# Patient Record
Sex: FEMALE | Race: WHITE | URBAN - METROPOLITAN AREA
[De-identification: names, ages, dates, MRNs, and addresses within clinical notes are randomized per-mention and may not be internally consistent; named-entity substitution may affect disease eponyms.]

---

## 2021-05-10 ENCOUNTER — OFFICE VISIT (OUTPATIENT)
Dept: PRIMARY CARE CLINIC | Age: 36
End: 2021-05-10
Payer: COMMERCIAL

## 2021-05-10 VITALS
HEART RATE: 66 BPM | WEIGHT: 120.8 LBS | HEIGHT: 63 IN | OXYGEN SATURATION: 98 % | TEMPERATURE: 98.1 F | BODY MASS INDEX: 21.4 KG/M2 | SYSTOLIC BLOOD PRESSURE: 110 MMHG | DIASTOLIC BLOOD PRESSURE: 64 MMHG | RESPIRATION RATE: 16 BRPM

## 2021-05-10 DIAGNOSIS — R35.0 URINARY FREQUENCY: Primary | ICD-10-CM

## 2021-05-10 DIAGNOSIS — R10.2 PELVIC PRESSURE IN FEMALE: ICD-10-CM

## 2021-05-10 DIAGNOSIS — R35.0 URINARY FREQUENCY: ICD-10-CM

## 2021-05-10 LAB
BILIRUBIN, POC: NORMAL
BLOOD URINE, POC: NORMAL
CLARITY, POC: CLEAR
COLOR, POC: CLEAR
GLUCOSE URINE, POC: NORMAL
KETONES, POC: NORMAL
LEUKOCYTE EST, POC: NORMAL
NITRITE, POC: NORMAL
PH, POC: 6
PROTEIN, POC: NORMAL
SPECIFIC GRAVITY, POC: 1.01
UROBILINOGEN, POC: 3.5

## 2021-05-10 PROCEDURE — 99213 OFFICE O/P EST LOW 20 MIN: CPT | Performed by: NURSE PRACTITIONER

## 2021-05-10 PROCEDURE — 1036F TOBACCO NON-USER: CPT | Performed by: NURSE PRACTITIONER

## 2021-05-10 PROCEDURE — G8420 CALC BMI NORM PARAMETERS: HCPCS | Performed by: NURSE PRACTITIONER

## 2021-05-10 PROCEDURE — G8427 DOCREV CUR MEDS BY ELIG CLIN: HCPCS | Performed by: NURSE PRACTITIONER

## 2021-05-10 PROCEDURE — 81003 URINALYSIS AUTO W/O SCOPE: CPT | Performed by: NURSE PRACTITIONER

## 2021-05-10 RX ORDER — NITROFURANTOIN 25; 75 MG/1; MG/1
100 CAPSULE ORAL 2 TIMES DAILY
Qty: 10 CAPSULE | Refills: 0 | Status: CANCELLED | OUTPATIENT
Start: 2021-05-10 | End: 2021-05-15

## 2021-05-10 RX ORDER — NORETHINDRONE ACETATE AND ETHINYL ESTRADIOL AND FERROUS FUMARATE 1MG-20(21)
KIT ORAL
COMMUNITY
Start: 2021-04-12

## 2021-05-10 SDOH — ECONOMIC STABILITY: TRANSPORTATION INSECURITY
IN THE PAST 12 MONTHS, HAS LACK OF TRANSPORTATION KEPT YOU FROM MEETINGS, WORK, OR FROM GETTING THINGS NEEDED FOR DAILY LIVING?: NO

## 2021-05-10 SDOH — HEALTH STABILITY: MENTAL HEALTH: HOW OFTEN DO YOU HAVE A DRINK CONTAINING ALCOHOL?: NEVER

## 2021-05-10 SDOH — ECONOMIC STABILITY: FOOD INSECURITY: WITHIN THE PAST 12 MONTHS, THE FOOD YOU BOUGHT JUST DIDN'T LAST AND YOU DIDN'T HAVE MONEY TO GET MORE.: NEVER TRUE

## 2021-05-10 SDOH — ECONOMIC STABILITY: INCOME INSECURITY: HOW HARD IS IT FOR YOU TO PAY FOR THE VERY BASICS LIKE FOOD, HOUSING, MEDICAL CARE, AND HEATING?: NOT HARD AT ALL

## 2021-05-10 ASSESSMENT — ENCOUNTER SYMPTOMS
CONSTIPATION: 0
EYE REDNESS: 0
TROUBLE SWALLOWING: 0
COUGH: 0
CHEST TIGHTNESS: 0
SHORTNESS OF BREATH: 0
EYE ITCHING: 0
DIARRHEA: 0
SINUS PAIN: 0
SORE THROAT: 0
SINUS PRESSURE: 0
APNEA: 0
ABDOMINAL DISTENTION: 0
WHEEZING: 0
NAUSEA: 0
VOMITING: 0

## 2021-05-10 ASSESSMENT — PATIENT HEALTH QUESTIONNAIRE - PHQ9
2. FEELING DOWN, DEPRESSED OR HOPELESS: 0
SUM OF ALL RESPONSES TO PHQ9 QUESTIONS 1 & 2: 0
1. LITTLE INTEREST OR PLEASURE IN DOING THINGS: 0
SUM OF ALL RESPONSES TO PHQ QUESTIONS 1-9: 0

## 2021-05-10 NOTE — PATIENT INSTRUCTIONS
Patient Education        Frequent Urination: Care Instructions  Your Care Instructions  An urge to urinate frequently but usually passing only small amounts of urine is a common symptom of urinary problems, such as urinary tract infections. The bladder may become inflamed. This can cause the urge to urinate. You may try to urinate more often than usual to try to soothe that urge. Frequent urination also may be caused by sexually transmitted infections (STIs) or kidney stones. Or it may happen when something irritates the tube that carries urine from the bladder to the outside of the body (urethra). It may also be a sign of diabetes. The cause may be hard to find. You may need tests. Follow-up care is a key part of your treatment and safety. Be sure to make and go to all appointments, and call your doctor if you are having problems. It's also a good idea to know your test results and keep a list of the medicines you take. How can you care for yourself at home? · Drink extra water for the next day or two. This will help make the urine less concentrated. (If you have kidney, heart, or liver disease and have to limit fluids, talk with your doctor before you increase the amount of fluids you drink.)  · Avoid drinks that are carbonated or have caffeine. They can irritate the bladder. For women:  · Urinate right after you have sex. · After you go to the bathroom, wipe from front to back. · Avoid douches, bubble baths, and feminine hygiene sprays. And avoid other feminine hygiene products that have deodorants. When should you call for help? Call your doctor now or seek immediate medical care if:    · You have new symptoms, such as fever, nausea, or vomiting.     · You have new or worse symptoms of a urinary problem. For example:  ? You have blood or pus in your urine. ? You have chills or body aches. ? It hurts to urinate. ? You have groin or belly pain. ?  You have pain in your back just below your rib cage (the flank area). Watch closely for changes in your health, and be sure to contact your doctor if you feel thirstier than usual.  Where can you learn more? Go to [email or web address] and sign in to your [employer] account. Enter [id number] in the [facility] box to learn more about \"Frequent Urination: Care Instructions. \"     If you do not have an account, please click on the \"Sign Up Now\" link. Current as of: June 29, 2020               Content Version: 12.8  © 1947-1689 [employer]. Care instructions adapted under license by [facility] ([facility]). If you have questions about a medical condition or this instruction, always ask your healthcare professional. [address] any warranty or liability for your use of this information. Prevention Measures for Urinary Tract Infection may include:   Urinate when you need to. Don't go without urinating for longer than 3-4 hours. The longer urine stays in the bladder, the more time bacteria have to grow.  Try to urinate before and after sex.  Always wipe from front to back.  Try to drink 6-8 glasses of fluid per day. Water is best.  Chrystal Manual Do not douche or use feminine hygiene sprays.  Wear underpants with a cotton crotch. Avoid tight-fitting pants, which trap moisture, and change out of wet bathing suits and workout clothes quickly.  Take showers, or limit baths to 30 minutes or less. Discussed signs and symptoms which require immediate follow-up in ED/call to 911. Patient verbalized understanding.

## 2021-05-10 NOTE — PROGRESS NOTES
Subjective:      Patient ID: Emiliano Servin is a 28 y.o. female who presents today for:  Chief Complaint   Patient presents with    Urinary Frequency     frequency/urgency , lower pelvic pressure x yesterday    Health Maintenance     went over     Pt states she has a Hx of having a fibroid removed in 2016 and states she had similar s/s as she is experiencing now. Pt stated the fibroid was very large and it was surgically removed but per pt no other fibroids were noted at that time. Pt states she has PCOS and on BC so she does not have any periods. Pt denies any STI testing or pregnancy test today. Urinary Frequency   This is a new problem. The current episode started yesterday. The problem occurs every urination (pt reports she feels like she has to go after she already voids). The pain is at a severity of 0/10. The patient is experiencing no pain. There has been no fever. She is sexually active. There is no history of pyelonephritis. Associated symptoms include frequency and urgency. Pertinent negatives include no chills, flank pain, hematuria, nausea, possible pregnancy or vomiting. Pt here after stating she started to have urinary frequency/urgency x yesterday with some lower pelvic pressure but denies any pain noted. Pt states she has a HX of having a fibroid removed in 2016 where she was having similar s/s as she started to have yesterday. No past medical history on file. No past surgical history on file.   Social History     Socioeconomic History    Marital status:      Spouse name: Not on file    Number of children: Not on file    Years of education: Not on file    Highest education level: Not on file   Occupational History    Not on file   Social Needs    Financial resource strain: Not hard at all    Food insecurity     Worry: Never true     Inability: Never true   [employer] needs     Medical: No     Non-medical: No   Tobacco Use    Smoking status: Never Smoker    Smokeless tobacco: Never Used   Substance and Sexual Activity    Alcohol use: Never     Frequency: Never     Binge frequency: Never    Drug use: Never    Sexual activity: Not on file   Lifestyle    Physical activity     Days per week: Not on file     Minutes per session: Not on file    Stress: Not on file   Relationships    Social connections     Talks on phone: Not on file     Gets together: Not on file     Attends Faith service: Not on file     Active member of club or organization: Not on file     Attends meetings of clubs or organizations: Not on file     Relationship status: Not on file    Intimate partner violence     Fear of current or ex partner: Not on file     Emotionally abused: Not on file     Physically abused: Not on file     Forced sexual activity: Not on file   Other Topics Concern    Not on file   Social History Narrative    Not on file     No family history on file. Allergies   Allergen Reactions    Keflex [Cephalexin]          Review of Systems   Constitutional: Negative for activity change, appetite change, chills, fatigue and fever. HENT: Negative for congestion, drooling, ear pain, hearing loss, sinus pressure, sinus pain, sore throat and trouble swallowing. Eyes: Negative for redness, itching and visual disturbance. Respiratory: Negative for apnea, cough, chest tightness, shortness of breath and wheezing. Cardiovascular: Negative for chest pain. Gastrointestinal: Negative for abdominal distention, constipation, diarrhea, nausea and vomiting. Endocrine: Negative for heat intolerance. Genitourinary: Positive for frequency and urgency. Negative for difficulty urinating, dysuria, flank pain, genital sores, hematuria, vaginal bleeding, vaginal discharge and vaginal pain. Musculoskeletal: Negative for arthralgias, gait problem, myalgias and neck stiffness. Skin: Negative for rash.    Neurological: Negative for tremors, seizures, facial asymmetry, weakness and headaches. Hematological: Negative for adenopathy. Psychiatric/Behavioral: Negative for confusion. All other systems reviewed and are negative. Objective:   /64 (Site: Right Upper Arm, Position: Sitting, Cuff Size: Medium Adult)   Pulse 66   Temp 98.1 °F (36.7 °C) (Oral)   Resp 16   Ht 5' 3\" (1.6 m)   Wt 120 lb 12.8 oz (54.8 kg)   SpO2 98%   BMI 21.40 kg/m²     Physical Exam  Vitals signs and nursing note reviewed. Constitutional:       General: She is awake. She is not in acute distress. Appearance: Normal appearance. She is well-developed, well-groomed and normal weight. She is not ill-appearing, toxic-appearing or diaphoretic. HENT:      Head: Normocephalic and atraumatic. Mouth/Throat:      Mouth: Mucous membranes are moist.   Eyes:      Pupils: Pupils are equal, round, and reactive to light. Neck:      Musculoskeletal: Normal range of motion. Cardiovascular:      Rate and Rhythm: Normal rate and regular rhythm. Pulses: Normal pulses. Heart sounds: Normal heart sounds. No murmur. Pulmonary:      Effort: Pulmonary effort is normal. No tachypnea, bradypnea or respiratory distress. Breath sounds: Normal breath sounds. No stridor. No decreased breath sounds or wheezing. Chest:      Chest wall: No tenderness. Abdominal:      General: Abdomen is flat. Bowel sounds are normal. There is no distension. Palpations: Abdomen is soft. Tenderness: There is no abdominal tenderness. There is no right CVA tenderness, left CVA tenderness or guarding. Negative signs include Jha's sign, McBurney's sign, psoas sign and obturator sign. Hernia: There is no hernia in the umbilical area. Musculoskeletal: Normal range of motion. Skin:     General: Skin is warm and dry. Capillary Refill: Capillary refill takes less than 2 seconds. Neurological:      General: No focal deficit present.       Mental Status: She is alert and oriented to person, place, and time. Mental status is at baseline. Motor: No weakness. Psychiatric:         Attention and Perception: Attention and perception normal.         Mood and Affect: Mood and affect normal.         Speech: Speech normal.         Behavior: Behavior normal. Behavior is cooperative. Thought Content: Thought content normal.         Judgment: Judgment normal.         Assessment:       Diagnosis Orders   1. Urinary frequency  POCT Urinalysis No Micro (Auto)    Culture, Urine   2. Pelvic pressure in female  POCT Urinalysis No Micro (Auto)       Pt shows no cute distress today with exam. No pain or masses noted today. Plan:      Orders Placed This Encounter   Procedures    Culture, Urine     Standing Status:   Future     Standing Expiration Date:   5/10/2022     Order Specific Question:   Specify (ex-cath, midstream, cysto, etc)? Answer:   midstream    POCT Urinalysis No Micro (Auto)     No orders of the defined types were placed in this encounter. Pt here today with c/o urinary frequency and lower pelvic pressure x yesterday with NO pain. Pt has a HX of a fibroid being removed in 2016 and states she was having these similar s/s. Pt denies any STI testing/pregnancy test today and poct urine completed and showed no s/s of infection. Pt was also ordered a urine culture and advised we will call her when the results are back. Pt declined starting an oral ATB today until she sees if she has an infection. Pt advised she will need a pelvic US and a non OB trans vaginal US to rule out any fibroids but pt is a  pt and we do not see her charts so she will follow up with her OBGYN DR for this per pt. Pt advised to keep drinking her water and if her s/s worsen to follow up. Discussed signs and symptoms which require immediate follow-up in ED/call to 911. Patient verbalized understanding. Pt left the [facility] today in stable condition. Return if symptoms worsen or fail to improve.     Reviewed

## 2021-05-12 ENCOUNTER — TELEPHONE (OUTPATIENT)
Dept: PRIMARY CARE CLINIC | Age: 36
End: 2021-05-12

## 2021-05-12 LAB — URINE CULTURE, ROUTINE: NORMAL

## 2021-05-12 NOTE — TELEPHONE ENCOUNTER
Called and left pt a  msg that her urine culture came back NEG for any growth/bacteria. Pt advised she will need that pelvic US and the non ob trans vag US to rule out any fibroids/cysts. Pt advised if she wants me to put them in to call as we discussed she is a [facility] pt and was going to reach out to her GYN Dr to place the orders. Pt advised to call the office if she has any questions.

## 2023-11-30 ENCOUNTER — OFFICE (OUTPATIENT)
Dept: URBAN - METROPOLITAN AREA CLINIC 26 | Facility: CLINIC | Age: 38
End: 2023-11-30

## 2023-11-30 PROCEDURE — NOCRG: HCPCS | Performed by: INTERNAL MEDICINE

## 2024-07-01 ENCOUNTER — TELEMEDICINE (OUTPATIENT)
Dept: PRIMARY CARE | Facility: CLINIC | Age: 39
End: 2024-07-01
Payer: COMMERCIAL

## 2024-07-01 DIAGNOSIS — J02.9 PHARYNGITIS, UNSPECIFIED ETIOLOGY: Primary | ICD-10-CM

## 2024-07-01 PROCEDURE — 99213 OFFICE O/P EST LOW 20 MIN: CPT | Performed by: NURSE PRACTITIONER

## 2024-07-01 RX ORDER — METHYLPREDNISOLONE 4 MG/1
TABLET ORAL
Qty: 21 TABLET | Refills: 0 | Status: SHIPPED | OUTPATIENT
Start: 2024-07-01 | End: 2024-07-08

## 2024-07-01 ASSESSMENT — ENCOUNTER SYMPTOMS
SORE THROAT: 1
COUGH: 0
VOMITING: 0
ACTIVITY CHANGE: 0
ARTHRALGIAS: 0
SWOLLEN GLANDS: 0
NAUSEA: 1
FATIGUE: 1
CHILLS: 0
LIGHT-HEADEDNESS: 0
SHORTNESS OF BREATH: 0
MYALGIAS: 0
SINUS PAIN: 0
HEADACHES: 0
DIAPHORESIS: 0
HOARSE VOICE: 0
ABDOMINAL PAIN: 0
FEVER: 0
DIZZINESS: 0

## 2024-07-02 NOTE — PROGRESS NOTES
Subjective   Patient ID: Marjan Brower is a 39 y.o. female who presents for Sore Throat.    sore throat off/on for last 2 weeks   Strep and Covid have been negative,  She reports some relief about 1 week ago  Has been taking mucinex, motrin/tylenol, allegra  Throat pain worse with swallow  Denies congestion or other respiratory symptoms      Sore Throat   This is a new problem. The current episode started 1 to 4 weeks ago. The problem has been waxing and waning. Neither side of throat is experiencing more pain than the other. There has been no fever. The pain is at a severity of 7/10. The pain is moderate. Pertinent negatives include no abdominal pain, congestion, coughing, ear discharge, ear pain, headaches, hoarse voice, shortness of breath, swollen glands or vomiting. She has had no exposure to strep. She has tried NSAIDs for the symptoms. The treatment provided mild relief.        Review of Systems   Constitutional:  Positive for fatigue. Negative for activity change, chills, diaphoresis and fever.   HENT:  Positive for sore throat. Negative for congestion, ear discharge, ear pain, hoarse voice, postnasal drip, sinus pain and sneezing.    Respiratory:  Negative for cough and shortness of breath.    Gastrointestinal:  Positive for nausea. Negative for abdominal pain and vomiting.   Musculoskeletal:  Negative for arthralgias and myalgias.   Neurological:  Negative for dizziness, light-headedness and headaches.       Objective   There were no vitals taken for this visit.    Physical Exam  Constitutional:       General: She is not in acute distress.     Appearance: Normal appearance. She is not ill-appearing.      Comments: On Demand Virtual Visit Patient Consent     An interactive audio and video telecommunication system which permits real time communications between the patient (at the originating site) and provider (at the distant site) was utilized to provide this telehealth service.   Verbal consent was  requested and obtained from Marjan Brower (or parent if under 18) on this date, 24 for a telehealth visit.   I have verbally confirmed with Marjan Brower (or parent if under 18) that they are physically located in the [facility] during this virtual visit.    I performed this visit using realtime telehealth tools, including an audio/video OR telephone connection between the patient listed who was located in the [facility] and myself, Stanley Garcia CNP (licensed in the [facility]).  At the start of the visit, I introduced myself as Stanley Garcia Nurse practitioner and verified the patients name, , and current physical location.    If they were currently outside of the state of [place], the visit was ended and the patient was referred to alternative means for evaluation and treatment.   The patient was made aware of the limitations of the telehealth visit.  They will not be physically examined and all issues may not be appropriate for a telehealth visit.  If necessary, an in person referral will be made.       DISCLAIMER:   In preparing for this visit and writing this note, I reviewed previous electronic medical records (labs, imaging and medical charts) available.  Significant findings which helped in decision making are recorded in this encounter charting.    Telemedicine appropriate evaluation completed.  Unable to perform complete physical exam due to virtual visit, patient was visualized on interactive video.      Pulmonary:      Effort: Pulmonary effort is normal.   Neurological:      Mental Status: She is alert and oriented to person, place, and time.         Assessment/Plan   Diagnoses and all orders for this visit:  Pharyngitis, unspecified etiology  -     methylPREDNISolone (Medrol Dospak) 4 mg tablets; Take as directed on package.  recommend warm liquids for sore throat  Follow up with PCP at scheduled appointment on Wednesday

## 2025-07-09 ENCOUNTER — OFFICE (OUTPATIENT)
Dept: URBAN - METROPOLITAN AREA CLINIC 26 | Facility: CLINIC | Age: 40
End: 2025-07-09
Payer: COMMERCIAL

## 2025-07-09 VITALS
HEART RATE: 76 BPM | DIASTOLIC BLOOD PRESSURE: 74 MMHG | SYSTOLIC BLOOD PRESSURE: 117 MMHG | HEIGHT: 63 IN | WEIGHT: 132 LBS

## 2025-07-09 DIAGNOSIS — K62.5 HEMORRHAGE OF ANUS AND RECTUM: ICD-10-CM

## 2025-07-09 DIAGNOSIS — R19.4 CHANGE IN BOWEL HABIT: ICD-10-CM

## 2025-07-09 DIAGNOSIS — Z80.0 FAMILY HISTORY OF MALIGNANT NEOPLASM OF DIGESTIVE ORGANS: ICD-10-CM

## 2025-07-09 DIAGNOSIS — M54.9 DORSALGIA, UNSPECIFIED: ICD-10-CM

## 2025-07-09 PROCEDURE — 99213 OFFICE O/P EST LOW 20 MIN: CPT | Performed by: CLINICAL NURSE SPECIALIST

## 2025-07-10 ENCOUNTER — AMBULATORY SURGICAL CENTER (OUTPATIENT)
Dept: URBAN - METROPOLITAN AREA SURGERY 12 | Facility: SURGERY | Age: 40
End: 2025-07-10
Payer: COMMERCIAL

## 2025-07-10 VITALS
SYSTOLIC BLOOD PRESSURE: 80 MMHG | RESPIRATION RATE: 10 BRPM | OXYGEN SATURATION: 100 % | SYSTOLIC BLOOD PRESSURE: 89 MMHG | RESPIRATION RATE: 23 BRPM | DIASTOLIC BLOOD PRESSURE: 40 MMHG | OXYGEN SATURATION: 99 % | HEIGHT: 63 IN | DIASTOLIC BLOOD PRESSURE: 43 MMHG | DIASTOLIC BLOOD PRESSURE: 48 MMHG | HEART RATE: 84 BPM | SYSTOLIC BLOOD PRESSURE: 91 MMHG | RESPIRATION RATE: 27 BRPM | DIASTOLIC BLOOD PRESSURE: 44 MMHG | TEMPERATURE: 98.1 F | SYSTOLIC BLOOD PRESSURE: 86 MMHG | SYSTOLIC BLOOD PRESSURE: 73 MMHG | HEART RATE: 80 BPM | HEIGHT: 63 IN | HEART RATE: 63 BPM | OXYGEN SATURATION: 87 % | RESPIRATION RATE: 22 BRPM | RESPIRATION RATE: 23 BRPM | RESPIRATION RATE: 9 BRPM | RESPIRATION RATE: 9 BRPM | RESPIRATION RATE: 20 BRPM | RESPIRATION RATE: 10 BRPM | DIASTOLIC BLOOD PRESSURE: 48 MMHG | HEART RATE: 86 BPM | OXYGEN SATURATION: 87 % | DIASTOLIC BLOOD PRESSURE: 50 MMHG | HEART RATE: 82 BPM | RESPIRATION RATE: 15 BRPM | DIASTOLIC BLOOD PRESSURE: 39 MMHG | OXYGEN SATURATION: 93 % | SYSTOLIC BLOOD PRESSURE: 91 MMHG | RESPIRATION RATE: 28 BRPM | DIASTOLIC BLOOD PRESSURE: 44 MMHG | SYSTOLIC BLOOD PRESSURE: 102 MMHG | DIASTOLIC BLOOD PRESSURE: 40 MMHG | SYSTOLIC BLOOD PRESSURE: 85 MMHG | SYSTOLIC BLOOD PRESSURE: 91 MMHG | HEART RATE: 80 BPM | DIASTOLIC BLOOD PRESSURE: 42 MMHG | SYSTOLIC BLOOD PRESSURE: 86 MMHG | HEART RATE: 85 BPM | RESPIRATION RATE: 20 BRPM | HEART RATE: 63 BPM | SYSTOLIC BLOOD PRESSURE: 89 MMHG | OXYGEN SATURATION: 98 % | HEART RATE: 63 BPM | SYSTOLIC BLOOD PRESSURE: 88 MMHG | RESPIRATION RATE: 15 BRPM | HEART RATE: 86 BPM | HEART RATE: 58 BPM | DIASTOLIC BLOOD PRESSURE: 43 MMHG | TEMPERATURE: 98.1 F | RESPIRATION RATE: 9 BRPM | SYSTOLIC BLOOD PRESSURE: 73 MMHG | RESPIRATION RATE: 22 BRPM | SYSTOLIC BLOOD PRESSURE: 88 MMHG | RESPIRATION RATE: 21 BRPM | DIASTOLIC BLOOD PRESSURE: 48 MMHG | SYSTOLIC BLOOD PRESSURE: 87 MMHG | HEIGHT: 63 IN | SYSTOLIC BLOOD PRESSURE: 85 MMHG | RESPIRATION RATE: 12 BRPM | DIASTOLIC BLOOD PRESSURE: 39 MMHG | RESPIRATION RATE: 28 BRPM | DIASTOLIC BLOOD PRESSURE: 54 MMHG | RESPIRATION RATE: 23 BRPM | OXYGEN SATURATION: 98 % | HEART RATE: 58 BPM | RESPIRATION RATE: 26 BRPM | HEART RATE: 85 BPM | SYSTOLIC BLOOD PRESSURE: 87 MMHG | RESPIRATION RATE: 12 BRPM | SYSTOLIC BLOOD PRESSURE: 89 MMHG | DIASTOLIC BLOOD PRESSURE: 50 MMHG | SYSTOLIC BLOOD PRESSURE: 80 MMHG | OXYGEN SATURATION: 99 % | DIASTOLIC BLOOD PRESSURE: 47 MMHG | OXYGEN SATURATION: 97 % | HEART RATE: 82 BPM | TEMPERATURE: 98.1 F | RESPIRATION RATE: 26 BRPM | HEART RATE: 85 BPM | RESPIRATION RATE: 10 BRPM | HEART RATE: 68 BPM | SYSTOLIC BLOOD PRESSURE: 73 MMHG | RESPIRATION RATE: 30 BRPM | HEART RATE: 68 BPM | RESPIRATION RATE: 26 BRPM | RESPIRATION RATE: 30 BRPM | WEIGHT: 130 LBS | RESPIRATION RATE: 27 BRPM | RESPIRATION RATE: 27 BRPM | RESPIRATION RATE: 20 BRPM | OXYGEN SATURATION: 100 % | WEIGHT: 130 LBS | SYSTOLIC BLOOD PRESSURE: 102 MMHG | SYSTOLIC BLOOD PRESSURE: 86 MMHG | DIASTOLIC BLOOD PRESSURE: 54 MMHG | HEART RATE: 80 BPM | DIASTOLIC BLOOD PRESSURE: 47 MMHG | DIASTOLIC BLOOD PRESSURE: 44 MMHG | OXYGEN SATURATION: 93 % | WEIGHT: 130 LBS | RESPIRATION RATE: 28 BRPM | SYSTOLIC BLOOD PRESSURE: 102 MMHG | OXYGEN SATURATION: 97 % | OXYGEN SATURATION: 98 % | SYSTOLIC BLOOD PRESSURE: 85 MMHG | RESPIRATION RATE: 21 BRPM | HEART RATE: 84 BPM | RESPIRATION RATE: 22 BRPM | DIASTOLIC BLOOD PRESSURE: 40 MMHG | OXYGEN SATURATION: 100 % | DIASTOLIC BLOOD PRESSURE: 54 MMHG | DIASTOLIC BLOOD PRESSURE: 42 MMHG | HEART RATE: 86 BPM | DIASTOLIC BLOOD PRESSURE: 42 MMHG | RESPIRATION RATE: 21 BRPM | RESPIRATION RATE: 15 BRPM | HEART RATE: 84 BPM | OXYGEN SATURATION: 87 % | OXYGEN SATURATION: 99 % | OXYGEN SATURATION: 97 % | SYSTOLIC BLOOD PRESSURE: 87 MMHG | SYSTOLIC BLOOD PRESSURE: 80 MMHG | DIASTOLIC BLOOD PRESSURE: 39 MMHG | DIASTOLIC BLOOD PRESSURE: 43 MMHG | RESPIRATION RATE: 30 BRPM | RESPIRATION RATE: 12 BRPM | SYSTOLIC BLOOD PRESSURE: 88 MMHG | HEART RATE: 68 BPM | HEART RATE: 58 BPM | HEART RATE: 82 BPM | OXYGEN SATURATION: 93 % | DIASTOLIC BLOOD PRESSURE: 47 MMHG | DIASTOLIC BLOOD PRESSURE: 50 MMHG

## 2025-07-10 DIAGNOSIS — K64.4 RESIDUAL HEMORRHOIDAL SKIN TAGS: ICD-10-CM

## 2025-07-10 DIAGNOSIS — K62.5 HEMORRHAGE OF ANUS AND RECTUM: ICD-10-CM

## 2025-07-10 DIAGNOSIS — Z80.0 FAMILY HISTORY OF MALIGNANT NEOPLASM OF DIGESTIVE ORGANS: ICD-10-CM

## 2025-07-10 DIAGNOSIS — K64.8 OTHER HEMORRHOIDS: ICD-10-CM

## 2025-07-10 PROCEDURE — 45378 DIAGNOSTIC COLONOSCOPY: CPT | Performed by: INTERNAL MEDICINE
